# Patient Record
Sex: FEMALE | Race: WHITE | NOT HISPANIC OR LATINO | Employment: UNEMPLOYED | URBAN - METROPOLITAN AREA
[De-identification: names, ages, dates, MRNs, and addresses within clinical notes are randomized per-mention and may not be internally consistent; named-entity substitution may affect disease eponyms.]

---

## 2022-01-22 PROCEDURE — U0004 COV-19 TEST NON-CDC HGH THRU: HCPCS | Performed by: PHYSICIAN ASSISTANT

## 2022-01-22 PROCEDURE — U0005 INFEC AGEN DETEC AMPLI PROBE: HCPCS | Performed by: PHYSICIAN ASSISTANT

## 2022-01-24 ENCOUNTER — TELEPHONE (OUTPATIENT)
Dept: URGENT CARE | Facility: CLINIC | Age: 67
End: 2022-01-24

## 2022-01-24 DIAGNOSIS — B37.9 YEAST INFECTION: Primary | ICD-10-CM

## 2022-01-24 RX ORDER — FLUCONAZOLE 150 MG/1
150 TABLET ORAL ONCE
Qty: 1 TABLET | Refills: 0 | Status: SHIPPED | OUTPATIENT
Start: 2022-01-24 | End: 2022-01-24

## 2022-01-25 NOTE — TELEPHONE ENCOUNTER
Patient is contacted with her positive Covid result.  She is concerned she may develop a yeast infection from the antibiotic being used to treat her sinus infection.  A dose of Diflucan 150 mg one-time dose is ordered at her pharmacy.  She is to see her doctor in 5 to 7 days to follow-up.  Overall she states she is feeling better.  Continue to quarantine

## 2022-01-27 ENCOUNTER — TELEPHONE (OUTPATIENT)
Dept: URGENT CARE | Facility: CLINIC | Age: 67
End: 2022-01-27

## 2022-01-27 PROCEDURE — 87086 URINE CULTURE/COLONY COUNT: CPT | Performed by: PHYSICIAN ASSISTANT

## 2022-01-27 PROCEDURE — 87186 SC STD MICRODIL/AGAR DIL: CPT | Performed by: PHYSICIAN ASSISTANT

## 2022-01-27 PROCEDURE — 87077 CULTURE AEROBIC IDENTIFY: CPT | Performed by: PHYSICIAN ASSISTANT

## 2022-01-27 NOTE — TELEPHONE ENCOUNTER
Patient called in stating that she is having burning while going to the restroom and need another rx for the fluconazole. I did inform the patient she needed to come in to be reevaluated. She stated understand...   
Patient baseline mental status

## 2023-04-24 ENCOUNTER — OFFICE VISIT (OUTPATIENT)
Dept: PODIATRY | Facility: CLINIC | Age: 68
End: 2023-04-24
Payer: MEDICARE

## 2023-04-24 VITALS
DIASTOLIC BLOOD PRESSURE: 89 MMHG | BODY MASS INDEX: 28.25 KG/M2 | OXYGEN SATURATION: 96 % | TEMPERATURE: 98.4 F | HEART RATE: 65 BPM | WEIGHT: 180 LBS | SYSTOLIC BLOOD PRESSURE: 139 MMHG | HEIGHT: 67 IN

## 2023-04-24 DIAGNOSIS — B35.1 ONYCHOMYCOSIS: ICD-10-CM

## 2023-04-24 DIAGNOSIS — M54.41 CHRONIC RIGHT-SIDED LOW BACK PAIN WITH RIGHT-SIDED SCIATICA: Primary | ICD-10-CM

## 2023-04-24 DIAGNOSIS — G89.29 CHRONIC RIGHT-SIDED LOW BACK PAIN WITH RIGHT-SIDED SCIATICA: Primary | ICD-10-CM

## 2023-04-24 DIAGNOSIS — L60.0 INGROWN TOENAIL: ICD-10-CM

## 2023-04-24 RX ORDER — MULTIPLE VITAMINS W/ MINERALS TAB 9MG-400MCG
1 TAB ORAL DAILY
COMMUNITY

## 2023-04-24 RX ORDER — LISINOPRIL 40 MG/1
1 TABLET ORAL DAILY
COMMUNITY
Start: 2023-01-25

## 2023-04-24 NOTE — PROGRESS NOTES
[facility] - PODIATRY    Today's Date: 04/24/23    Patient Name: Kathe Ayon  MRN: [id number]  CSN: [id number]  PCP: Provider, No Known,  Referring Provider: No ref. provider found    SUBJECTIVE     Chief Complaint   Patient presents with   • Right Foot - Establish Care, Nail Problem     Great toenail had trauma 2.5 years ago and came off, nail will grow back then fall off   Itching      • Left Foot - Establish Care, Nail Problem     Great toeanail came off and is slowing growing back      HPI: Kathe Ayon, a 68 y.o.female, presents to clinic.    Patient is a 68-year-old female presenting with right itching to her foot.  Patient states she is unable to satisfy the itch.  Patient says that she thinks she has a pinched nerve in her back from a fall 2 years ago.  Patient also says that her right great toenail falls off and grows back constantly.  She would like to know if there is anything we can do for this.    Past Medical History:   Diagnosis Date   • Hyperlipidemia    • Hypertension      Past Surgical History:   Procedure Laterality Date   • NO PAST SURGERIES       Family History   Problem Relation Age of Onset   • Heart disease Father    • Diabetes Father    • Stroke Neg Hx    • Cancer Neg Hx      Social History     Socioeconomic History   • Marital status:    Tobacco Use   • Smoking status: Never   • Smokeless tobacco: Never   Vaping Use   • Vaping Use: Never used   Substance and Sexual Activity   • Alcohol use: Never   • Drug use: Never   • Sexual activity: Defer     No Known Allergies  Current Outpatient Medications   Medication Sig Dispense Refill   • fluticasone (FLONASE) 50 MCG/ACT nasal spray      • lisinopril (PRINIVIL,ZESTRIL) 40 MG tablet Take 1 tablet by mouth Daily.     • multivitamin with minerals (MULTIVITAMIN ADULTS PO) Take 1 tablet by mouth Daily.     • Omega-3 Fatty Acids (fish oil) 1000 MG capsule capsule Take  by mouth Daily With Breakfast.     • pantoprazole  (PROTONIX) 40 MG EC tablet Take 1 tablet by mouth Daily.     • simvastatin (ZOCOR) 20 MG tablet Take 1 tablet by mouth Daily.     • promethazine-dextromethorphan (PROMETHAZINE-DM) 6.25-15 MG/5ML syrup Take 5 mL by mouth 4 (Four) Times a Day As Needed for Cough. (Patient not taking: Reported on 4/24/2023) 180 mL 0     No current facility-administered medications for this visit.     Review of Systems   Constitutional: Negative.    HENT: Negative.    Eyes: Negative.    Respiratory: Negative.    Cardiovascular: Negative.    Gastrointestinal: Negative.    Endocrine: Negative.    Genitourinary: Negative.    Musculoskeletal: Negative.    Skin: Negative.    Allergic/Immunologic: Negative.    Neurological: Negative.         Itching to foot   Hematological: Negative.    Psychiatric/Behavioral: Negative.    All other systems reviewed and are negative.      OBJECTIVE     Vitals:    04/24/23 0932   BP: 139/89   Pulse: 65   Temp: 98.4 °F (36.9 °C)   SpO2: 96%       No results found for: WBC, RBC, HGB, HCT, MCV, MCH, MCHC, RDW, RDWSD, MPV, PLT, NEUTRORELPCT, LYMPHORELPCT, MONORELPCT, EOSRELPCT, BASORELPCT, AUTOIGPER, NEUTROABS, LYMPHSABS, MONOSABS, EOSABS, BASOSABS, AUTOIGNUM, NRBC      No results found for: GLUCOSE, BUN, CREATININE, EGFRIFNONA, EGFRIFAFRI, BCR, K, CO2, CALCIUM, PROTENTOTREF, ALBUMIN, LABIL2, BILIRUBIN, AST, ALT    Patient seen in no apparent distress.      PHYSICAL EXAM:     Foot/Ankle Exam    GENERAL  Appearance:  appears stated age  Orientation:  AAOx3  Affect:  appropriate  Gait:  unimpaired  Assistance:  independent  Right shoe gear: casual shoe  Left shoe gear: casual shoe    VASCULAR     Right Foot Vascularity   Normal vascular exam    Dorsalis pedis:  2+  Posterior tibial:  2+  Skin temperature:  warm  Edema grading:  None  CFT:  < 3 seconds  Pedal hair growth:  Present  Varicosities:  none     Left Foot Vascularity   Normal vascular exam    Dorsalis pedis:  2+  Posterior tibial:  2+  Skin temperature:   warm  Edema grading:  None  CFT:  < 3 seconds  Pedal hair growth:  Present  Varicosities:  none     NEUROLOGIC     Right Foot Neurologic   Normal sensation    Light touch sensation: normal  Vibratory sensation: normal  Hot/Cold sensation: normal     Left Foot Neurologic   Normal sensation    Light touch sensation: normal  Vibratory sensation: normal  Hot/Cold sensation:  normal    MUSCLE STRENGTH     Right Foot Muscle Strength   Foot dorsiflexion:  4  Foot plantar flexion:  4  Foot inversion:  4  Foot eversion:  4     Left Foot Muscle Strength   Foot dorsiflexion:  4  Foot plantar flexion:  4  Foot inversion:  4  Foot eversion:  4    RANGE OF MOTION     Right Foot Range of Motion   Foot and ankle ROM within normal limits       Left Foot Range of Motion   Foot and ankle ROM within normal limits      DERMATOLOGIC      Right Foot Dermatologic   Skin  Right foot skin is intact.      Left Foot Dermatologic   Skin  Left foot skin is intact.       RADIOLOGY:      No results found.    ASSESSMENT/PLAN     Diagnoses and all orders for this visit:    1. Chronic right-sided low back pain with right-sided sciatica (Primary)  -     Ambulatory Referral to Physical Therapy Evaluate and treat    2. Onychomycosis    3. Ingrown toenail    Patient would like to try physical therapy to see if it helps with her nerve discomfort in her foot.  Discussed permanent removal of the right great toenail.  Patient would like to hold off on removal at this time.  We will have her return to clinic in 6 to 8 weeks or as needed.    Comprehensive lower extremity examination and evaluation was performed.    Discussed findings and treatment plan including risks, benefits, and treatment options with patient in detail. Patient agreed with treatment plan.    Medications and allergies reviewed.  Reviewed available lab values along with other pertinent labs.  These were discussed with the patient.    An After Visit Summary was printed and given to the  patient at discharge, including (if requested) any available informative/educational handouts regarding diagnosis, treatment, or medications. All questions were answered to patient/family satisfaction. Should symptoms fail to improve or worsen they agree to call or return to clinic or to go to the Emergency Department. Discussed the importance of following up with any needed screening tests/labs/specialist appointments and any requested follow-up recommended by me today. Importance of maintaining follow-up discussed and patient accepts that missed appointments can delay diagnosis and potentially lead to worsening of conditions.    No follow-ups on file., or sooner if acute issues arise.    This document has been electronically signed by Jose Gerber DPM on April 24, 2023 11:39 EDT

## 2023-05-18 RX ORDER — METHYLPREDNISOLONE 4 MG/1
TABLET ORAL
Qty: 21 TABLET | Refills: 0 | Status: SHIPPED | OUTPATIENT
Start: 2023-05-18

## 2023-06-05 ENCOUNTER — OFFICE VISIT (OUTPATIENT)
Dept: PODIATRY | Facility: CLINIC | Age: 68
End: 2023-06-05
Payer: MEDICARE

## 2023-06-05 VITALS
DIASTOLIC BLOOD PRESSURE: 74 MMHG | SYSTOLIC BLOOD PRESSURE: 130 MMHG | HEIGHT: 67 IN | BODY MASS INDEX: 28.25 KG/M2 | WEIGHT: 180 LBS | HEART RATE: 65 BPM | OXYGEN SATURATION: 95 % | TEMPERATURE: 98.4 F

## 2023-06-05 DIAGNOSIS — L60.0 INGROWN TOENAIL: ICD-10-CM

## 2023-06-05 DIAGNOSIS — M25.551 PAIN OF RIGHT HIP: Primary | ICD-10-CM

## 2023-06-05 DIAGNOSIS — L03.031 CELLULITIS OF TOE OF RIGHT FOOT: ICD-10-CM

## 2023-06-05 NOTE — PROGRESS NOTES
[facility] - PODIATRY    Today's Date: 06/05/23    Patient Name: Kathe Ayon  MRN: [id number]  CSN: [id number]  PCP: Juma Toscano MD,  Referring Provider: No ref. provider found    SUBJECTIVE     Chief Complaint   Patient presents with    Right Foot - Follow-up     RX follow up / medrol dose pack was sent in on 5/18/23  Feeling better now   Stopped PT due to making pain worse      HPI: Kathe Ayon, a 68 y.o.female, presents to clinic.    Patient is a 68-year-old female presenting with right itching to her foot.  Patient states she is unable to satisfy the itch.  Patient says that she thinks she has a pinched nerve in her back from a fall 2 years ago.  Patient also says that her right great toenail falls off and grows back constantly.  She would like to know if there is anything we can do for this.    6/5/2023-states her right great toe is ingrown.  She still having right hip pain.    Past Medical History:   Diagnosis Date    Hyperlipidemia     Hypertension      Past Surgical History:   Procedure Laterality Date    NO PAST SURGERIES       Family History   Problem Relation Age of Onset    Heart disease Father     Diabetes Father     Stroke Neg Hx     Cancer Neg Hx      Social History     Socioeconomic History    Marital status:    Tobacco Use    Smoking status: Never    Smokeless tobacco: Never   Vaping Use    Vaping Use: Never used   Substance and Sexual Activity    Alcohol use: Never    Drug use: Never    Sexual activity: Defer     No Known Allergies  Current Outpatient Medications   Medication Sig Dispense Refill    fluticasone (FLONASE) 50 MCG/ACT nasal spray       lisinopril (PRINIVIL,ZESTRIL) 40 MG tablet Take 1 tablet by mouth Daily.      multivitamin with minerals tablet tablet Take 1 tablet by mouth Daily.      Omega-3 Fatty Acids (fish oil) 1000 MG capsule capsule Take  by mouth Daily With Breakfast.      pantoprazole (PROTONIX) 40 MG EC tablet Take 1 tablet by mouth  Daily.      simvastatin (ZOCOR) 20 MG tablet Take 1 tablet by mouth Daily.      methylPREDNISolone (MEDROL) 4 MG dose pack Take as directed (Patient not taking: Reported on 6/5/2023) 21 tablet 0    promethazine-dextromethorphan (PROMETHAZINE-DM) 6.25-15 MG/5ML syrup Take 5 mL by mouth 4 (Four) Times a Day As Needed for Cough. (Patient not taking: Reported on 4/24/2023) 180 mL 0     No current facility-administered medications for this visit.     Review of Systems   Constitutional: Negative.    HENT: Negative.     Eyes: Negative.    Respiratory: Negative.     Cardiovascular: Negative.    Gastrointestinal: Negative.    Endocrine: Negative.    Genitourinary: Negative.    Musculoskeletal: Negative.    Skin: Negative.    Allergic/Immunologic: Negative.    Neurological: Negative.         Itching to foot   Hematological: Negative.    Psychiatric/Behavioral: Negative.     All other systems reviewed and are negative.    OBJECTIVE     Vitals:    06/05/23 1249   BP: 130/74   Pulse: 65   Temp: 98.4 °F (36.9 °C)   SpO2: 95%       No results found for: WBC, RBC, HGB, HCT, MCV, MCH, MCHC, RDW, RDWSD, MPV, PLT, NEUTRORELPCT, LYMPHORELPCT, MONORELPCT, EOSRELPCT, BASORELPCT, AUTOIGPER, NEUTROABS, LYMPHSABS, MONOSABS, EOSABS, BASOSABS, AUTOIGNUM, NRBC      No results found for: GLUCOSE, BUN, CREATININE, EGFRIFNONA, EGFRIFAFRI, BCR, K, CO2, CALCIUM, PROTENTOTREF, ALBUMIN, LABIL2, BILIRUBIN, AST, ALT    Patient seen in no apparent distress.      PHYSICAL EXAM:     Foot/Ankle Exam    GENERAL  Appearance:  appears stated age  Orientation:  AAOx3  Affect:  appropriate  Gait:  unimpaired  Assistance:  independent  Right shoe gear: casual shoe  Left shoe gear: casual shoe    VASCULAR     Right Foot Vascularity   Normal vascular exam    Dorsalis pedis:  2+  Posterior tibial:  2+  Skin temperature:  warm  Edema grading:  None  CFT:  < 3 seconds  Pedal hair growth:  Present  Varicosities:  none     Left Foot Vascularity   Normal vascular exam     Dorsalis pedis:  2+  Posterior tibial:  2+  Skin temperature:  warm  Edema grading:  None  CFT:  < 3 seconds  Pedal hair growth:  Present  Varicosities:  none     NEUROLOGIC     Right Foot Neurologic   Normal sensation    Light touch sensation: normal  Vibratory sensation: normal  Hot/Cold sensation: normal     Left Foot Neurologic   Normal sensation    Light touch sensation: normal  Vibratory sensation: normal  Hot/Cold sensation:  normal    MUSCLE STRENGTH     Right Foot Muscle Strength   Foot dorsiflexion:  4  Foot plantar flexion:  4  Foot inversion:  4  Foot eversion:  4     Left Foot Muscle Strength   Foot dorsiflexion:  4  Foot plantar flexion:  4  Foot inversion:  4  Foot eversion:  4    RANGE OF MOTION     Right Foot Range of Motion   Foot and ankle ROM within normal limits       Left Foot Range of Motion   Foot and ankle ROM within normal limits      DERMATOLOGIC      Right Foot Dermatologic   Skin  Right foot skin is intact.      Left Foot Dermatologic   Skin  Left foot skin is intact.      Right foot additional comments: Pain to right great toenail medial border    RADIOLOGY:      No results found.    ASSESSMENT/PLAN     Diagnoses and all orders for this visit:    1. Pain of right hip (Primary)  -     Ambulatory Referral to Orthopedic Surgery    2. Ingrown toenail    3. Cellulitis of toe of right foot      Phenol and Alcohol Chemical Matrixectomy Procedure - This procedure is indicated for onychocryptosis of the right medial nail border(s). Indications, risks and benefits and alternative treatments have been discussed with this patient who has agreed to this procedure. The area was sterilely prepped with a povidone-iodine solution. The affected area was locally anesthetized with 3 ml, of 0.5% Marcaine plain. The offending nail plate was completely excised.  Next 3 applications of 89% phenol were applied to the matrix area x 30 seconds followed by irrigation with copious amounts of isopropyl alcohol.  A  sterile dressing was applied. The patient tolerated the procedure well.     Will get her a referral to orthopedics for continued hip pain following a fall last year.     Comprehensive lower extremity examination and evaluation was performed.    Discussed findings and treatment plan including risks, benefits, and treatment options with patient in detail. Patient agreed with treatment plan.    Medications and allergies reviewed.  Reviewed available lab values along with other pertinent labs.  These were discussed with the patient.    An After Visit Summary was printed and given to the patient at discharge, including (if requested) any available informative/educational handouts regarding diagnosis, treatment, or medications. All questions were answered to patient/family satisfaction. Should symptoms fail to improve or worsen they agree to call or return to clinic or to go to the Emergency Department. Discussed the importance of following up with any needed screening tests/labs/specialist appointments and any requested follow-up recommended by me today. Importance of maintaining follow-up discussed and patient accepts that missed appointments can delay diagnosis and potentially lead to worsening of conditions.    Return in about 2 weeks (around 6/19/2023)., or sooner if acute issues arise.    This document has been electronically signed by Jose Gerber DPM on June 5, 2023 14:19 EDT

## 2023-06-19 ENCOUNTER — OFFICE VISIT (OUTPATIENT)
Dept: PODIATRY | Facility: CLINIC | Age: 68
End: 2023-06-19
Payer: MEDICARE

## 2023-06-19 VITALS
HEIGHT: 67 IN | HEART RATE: 63 BPM | TEMPERATURE: 98 F | DIASTOLIC BLOOD PRESSURE: 84 MMHG | BODY MASS INDEX: 27.62 KG/M2 | OXYGEN SATURATION: 95 % | SYSTOLIC BLOOD PRESSURE: 157 MMHG | WEIGHT: 176 LBS

## 2023-06-19 DIAGNOSIS — B35.1 ONYCHOMYCOSIS: ICD-10-CM

## 2023-06-19 DIAGNOSIS — L60.0 INGROWN TOENAIL: Primary | ICD-10-CM

## 2023-06-19 DIAGNOSIS — L03.031 CELLULITIS OF TOE OF RIGHT FOOT: ICD-10-CM

## 2023-06-19 NOTE — PROGRESS NOTES
[facility] - PODIATRY    Today's Date: 06/19/23    Patient Name: Kathe Ayon  MRN: [id number]  CSN: [id number]  PCP: Juma Toscano MD,  Referring Provider: No ref. provider found    SUBJECTIVE     Chief Complaint   Patient presents with    Right Foot - Follow-up, Ingrown Toenail     Post P & A     HPI: Kathe Ayon, a 68 y.o.female, presents to clinic.    Patient is a 68-year-old female presenting with right itching to her foot.  Patient states she is unable to satisfy the itch.  Patient says that she thinks she has a pinched nerve in her back from a fall 2 years ago.  Patient also says that her right great toenail falls off and grows back constantly.  She would like to know if there is anything we can do for this.    6/5/2023-states her right great toe is ingrown.  She still having right hip pain.    6/19/2023-patient here for follow-up of right great toe pain.  Overall doing better.    Past Medical History:   Diagnosis Date    Hyperlipidemia     Hypertension     Ingrown toenail      Past Surgical History:   Procedure Laterality Date    NO PAST SURGERIES       Family History   Problem Relation Age of Onset    Heart disease Father     Diabetes Father     Stroke Neg Hx     Cancer Neg Hx      Social History     Socioeconomic History    Marital status:    Tobacco Use    Smoking status: Never    Smokeless tobacco: Never   Vaping Use    Vaping Use: Never used   Substance and Sexual Activity    Alcohol use: Never    Drug use: Never    Sexual activity: Defer     No Known Allergies  Current Outpatient Medications   Medication Sig Dispense Refill    fluticasone (FLONASE) 50 MCG/ACT nasal spray       lisinopril (PRINIVIL,ZESTRIL) 40 MG tablet Take 1 tablet by mouth Daily.      multivitamin with minerals tablet tablet Take 1 tablet by mouth Daily.      Omega-3 Fatty Acids (fish oil) 1000 MG capsule capsule Take  by mouth Daily With Breakfast.      pantoprazole (PROTONIX) 40 MG EC tablet Take  1 tablet by mouth Daily.      simvastatin (ZOCOR) 20 MG tablet Take 1 tablet by mouth Daily.      methylPREDNISolone (MEDROL) 4 MG dose pack Take as directed (Patient not taking: Reported on 6/5/2023) 21 tablet 0    promethazine-dextromethorphan (PROMETHAZINE-DM) 6.25-15 MG/5ML syrup Take 5 mL by mouth 4 (Four) Times a Day As Needed for Cough. (Patient not taking: Reported on 4/24/2023) 180 mL 0     No current facility-administered medications for this visit.     Review of Systems   Constitutional: Negative.    HENT: Negative.     Eyes: Negative.    Respiratory: Negative.     Cardiovascular: Negative.    Gastrointestinal: Negative.    Endocrine: Negative.    Genitourinary: Negative.    Musculoskeletal: Negative.    Skin: Negative.    Allergic/Immunologic: Negative.    Neurological: Negative.         Itching to foot   Hematological: Negative.    Psychiatric/Behavioral: Negative.     All other systems reviewed and are negative.    OBJECTIVE     Vitals:    06/19/23 1247   BP: 157/84   Pulse: 63   Temp: 98 °F (36.7 °C)   SpO2: 95%       No results found for: WBC, RBC, HGB, HCT, MCV, MCH, MCHC, RDW, RDWSD, MPV, PLT, NEUTRORELPCT, LYMPHORELPCT, MONORELPCT, EOSRELPCT, BASORELPCT, AUTOIGPER, NEUTROABS, LYMPHSABS, MONOSABS, EOSABS, BASOSABS, AUTOIGNUM, NRBC      No results found for: GLUCOSE, BUN, CREATININE, EGFRIFNONA, EGFRIFAFRI, BCR, K, CO2, CALCIUM, PROTENTOTREF, ALBUMIN, LABIL2, BILIRUBIN, AST, ALT    Patient seen in no apparent distress.      PHYSICAL EXAM:     Foot/Ankle Exam    GENERAL  Appearance:  appears stated age  Orientation:  AAOx3  Affect:  appropriate  Gait:  unimpaired  Assistance:  independent  Right shoe gear: casual shoe  Left shoe gear: casual shoe    VASCULAR     Right Foot Vascularity   Normal vascular exam    Dorsalis pedis:  2+  Posterior tibial:  2+  Skin temperature:  warm  Edema grading:  None  CFT:  < 3 seconds  Pedal hair growth:  Present  Varicosities:  none     Left Foot Vascularity   Normal  vascular exam    Dorsalis pedis:  2+  Posterior tibial:  2+  Skin temperature:  warm  Edema grading:  None  CFT:  < 3 seconds  Pedal hair growth:  Present  Varicosities:  none     NEUROLOGIC     Right Foot Neurologic   Normal sensation    Light touch sensation: normal  Vibratory sensation: normal  Hot/Cold sensation: normal     Left Foot Neurologic   Normal sensation    Light touch sensation: normal  Vibratory sensation: normal  Hot/Cold sensation:  normal    MUSCLE STRENGTH     Right Foot Muscle Strength   Foot dorsiflexion:  4  Foot plantar flexion:  4  Foot inversion:  4  Foot eversion:  4     Left Foot Muscle Strength   Foot dorsiflexion:  4  Foot plantar flexion:  4  Foot inversion:  4  Foot eversion:  4    RANGE OF MOTION     Right Foot Range of Motion   Foot and ankle ROM within normal limits       Left Foot Range of Motion   Foot and ankle ROM within normal limits      DERMATOLOGIC      Right Foot Dermatologic   Skin  Right foot skin is intact.      Left Foot Dermatologic   Skin  Left foot skin is intact.     RADIOLOGY:      No results found.    ASSESSMENT/PLAN     Diagnoses and all orders for this visit:    1. Ingrown toenail (Primary)    2. Cellulitis of toe of right foot    3. Onychomycosis          Comprehensive lower extremity examination and evaluation was performed.    Discussed findings and treatment plan including risks, benefits, and treatment options with patient in detail. Patient agreed with treatment plan.    Medications and allergies reviewed.  Reviewed available lab values along with other pertinent labs.  These were discussed with the patient.    An After Visit Summary was printed and given to the patient at discharge, including (if requested) any available informative/educational handouts regarding diagnosis, treatment, or medications. All questions were answered to patient/family satisfaction. Should symptoms fail to improve or worsen they agree to call or return to clinic or to go to the  Emergency Department. Discussed the importance of following up with any needed screening tests/labs/specialist appointments and any requested follow-up recommended by me today. Importance of maintaining follow-up discussed and patient accepts that missed appointments can delay diagnosis and potentially lead to worsening of conditions.    Return if symptoms worsen or fail to improve., or sooner if acute issues arise.    This document has been electronically signed by Jose Gerber DPM on June 19, 2023 13:29 EDT

## 2023-08-07 ENCOUNTER — OFFICE VISIT (OUTPATIENT)
Dept: ORTHOPEDIC SURGERY | Facility: CLINIC | Age: 68
End: 2023-08-07
Payer: MEDICARE

## 2023-08-07 VITALS
SYSTOLIC BLOOD PRESSURE: 135 MMHG | HEIGHT: 67 IN | OXYGEN SATURATION: 98 % | WEIGHT: 175 LBS | BODY MASS INDEX: 27.47 KG/M2 | DIASTOLIC BLOOD PRESSURE: 83 MMHG | HEART RATE: 67 BPM

## 2023-08-07 DIAGNOSIS — M51.16 LUMBAR DISC DISEASE WITH RADICULOPATHY: ICD-10-CM

## 2023-08-07 DIAGNOSIS — M76.891 HIP TENDONITIS, RIGHT: Primary | ICD-10-CM

## 2023-08-07 NOTE — PROGRESS NOTES
"Chief Complaint  Pain and Follow-up of the Right Hip    Subjective          Kathe Ayon presents to [facility] for   History of Present Illness    The patient presents here today for follow up evaluation of the right hip. The patient has been treating her right hip tendonitis conservatively. She reports relief with the previous injection. She has been doing home exercises.     No Known Allergies     Social History     Socioeconomic History    Marital status:    Tobacco Use    Smoking status: Never    Smokeless tobacco: Never   Vaping Use    Vaping Use: Never used   Substance and Sexual Activity    Alcohol use: Never    Drug use: Never    Sexual activity: Defer        I reviewed the patient's chief complaint, history of present illness, review of systems, past medical history, surgical history, family history, social history, medications, and allergy list.     REVIEW OF SYSTEMS    Constitutional: Denies fevers, chills, weight loss  Cardiovascular: Denies chest pain, shortness of breath  Skin: Denies rashes, acute skin changes  Neurologic: Denies headache, loss of consciousness  MSK: Right hip pain      Objective   Vital Signs:   /83   Pulse 67   Ht 170.2 cm (67\")   Wt 79.4 kg (175 lb)   SpO2 98%   BMI 27.41 kg/mý     Body mass index is 27.41 kg/mý.    Physical Exam    General: Alert. No acute distress.   Right hip- Positive EHL, FHL, GS and TA. Sensation intact to all 5 nerves of the foot. Positive pulses. Non-tender today. No pain with hip motion today. Flexion 90. External Rotation 45. Internal rotation 20. 5/5 hip flexion and Abduction. Full knee ROM.      Procedures    Imaging Results (Most Recent)       None                     Assessment and Plan        No results found.     Diagnoses and all orders for this visit:    1. Hip tendonitis, right (Primary)    2. Lumbar disc disease with radiculopathy        Discussed the treatment plan with the patient.  Plan for " conservative treatment at this time. Plan to continue home exercises at this time. She can resume activity as tolerated.         Call or return if worsening symptoms.    Scribed for Curtis Jewell MD by Leydi Chen  08/07/2023   09:07 EDT         Follow Up       PRN    Patient was given instructions and counseling regarding her condition or for health maintenance advice. Please see specific information pulled into the AVS if appropriate.       I have personally performed the services described in this document as scribed by the above individual and it is both accurate and complete.     Curtis Jewell MD  08/07/23  09:09 EDT

## 2023-10-02 ENCOUNTER — OFFICE VISIT (OUTPATIENT)
Dept: ORTHOPEDIC SURGERY | Facility: CLINIC | Age: 68
End: 2023-10-02
Payer: MEDICARE

## 2023-10-02 VITALS — BODY MASS INDEX: 28.72 KG/M2 | OXYGEN SATURATION: 98 % | HEIGHT: 67 IN | WEIGHT: 183 LBS | HEART RATE: 62 BPM

## 2023-10-02 DIAGNOSIS — M76.891 HIP TENDONITIS, RIGHT: Primary | ICD-10-CM

## 2023-10-02 RX ORDER — LIDOCAINE HYDROCHLORIDE 10 MG/ML
5 INJECTION, SOLUTION INFILTRATION; PERINEURAL
Status: COMPLETED | OUTPATIENT
Start: 2023-10-02 | End: 2023-10-02

## 2023-10-02 RX ORDER — TRIAMCINOLONE ACETONIDE 40 MG/ML
40 INJECTION, SUSPENSION INTRA-ARTICULAR; INTRAMUSCULAR
Status: COMPLETED | OUTPATIENT
Start: 2023-10-02 | End: 2023-10-02

## 2023-10-02 RX ADMIN — LIDOCAINE HYDROCHLORIDE 5 ML: 10 INJECTION, SOLUTION INFILTRATION; PERINEURAL at 09:29

## 2023-10-02 RX ADMIN — TRIAMCINOLONE ACETONIDE 40 MG: 40 INJECTION, SUSPENSION INTRA-ARTICULAR; INTRAMUSCULAR at 09:29

## 2023-10-02 NOTE — PROGRESS NOTES
"Chief Complaint  Pain and Follow-up of the Right Hip    Subjective          Kathe Ayon presents to [facility] for   History of Present Illness    The patient presents here today for evaluation of the right hip. The patient has been treating her right hip tendonitis conservatively. She reports relief with the previous injection. She has no new complaints.     No Known Allergies     Social History     Socioeconomic History    Marital status:    Tobacco Use    Smoking status: Never    Smokeless tobacco: Never   Vaping Use    Vaping Use: Never used   Substance and Sexual Activity    Alcohol use: Never    Drug use: Never    Sexual activity: Defer        I reviewed the patient's chief complaint, history of present illness, review of systems, past medical history, surgical history, family history, social history, medications, and allergy list.     REVIEW OF SYSTEMS    Constitutional: Denies fevers, chills, weight loss  Cardiovascular: Denies chest pain, shortness of breath  Skin: Denies rashes, acute skin changes  Neurologic: Denies headache, loss of consciousness  MSK: right hip pain      Objective   Vital Signs:   Pulse 62   Ht 170.2 cm (67\")   Wt 83 kg (183 lb)   SpO2 98%   BMI 28.66 kg/m²     Body mass index is 28.66 kg/m².    Physical Exam    General: Alert. No acute distress.   Right hip- Positive EHL, FHL, GS and TA. Sensation intact to all 5 nerves of the foot. Positive pulses. Non-tender today. No pain with hip motion today. Flexion 90. External Rotation 45. Internal rotation 20. 5/5 hip flexion and Abduction. Full knee ROM.       Large Joint Arthrocentesis: R greater trochanteric bursa  Date/Time: 10/2/2023 9:29 AM  Consent given by: patient  Site marked: site marked  Timeout: Immediately prior to procedure a time out was called to verify the correct patient, procedure, equipment, support staff and site/side marked as required   Supporting Documentation  Indications: " pain   Procedure Details  Location: hip - R greater trochanteric bursa  Preparation: 21 G.  Medications administered: 5 mL lidocaine 1 %; 40 mg triamcinolone acetonide 40 MG/ML  Patient tolerance: patient tolerated the procedure well with no immediate complications      Imaging Results (Most Recent)       None                     Assessment and Plan        No results found.     Diagnoses and all orders for this visit:    1. Hip tendonitis, right (Primary)        Discussed the treatment plan with the patient.  Discussed the risks and benefits of a right hip bursa injection. The patient expressed understanding and wished to proceed. She tolerated the injection well.       Call or return if worsening symptoms.    Scribed for Curtis Jewell MD by Leydi Chen  10/02/2023   09:08 EDT         Follow Up       PRN    Patient was given instructions and counseling regarding her condition or for health maintenance advice. Please see specific information pulled into the AVS if appropriate.       I have personally performed the services described in this document as scribed by the above individual and it is both accurate and complete.     Curtis Jewell MD  10/02/23  09:25 EDT

## 2024-02-12 ENCOUNTER — OFFICE VISIT (OUTPATIENT)
Dept: ORTHOPEDIC SURGERY | Facility: CLINIC | Age: 69
End: 2024-02-12
Payer: MEDICARE

## 2024-02-12 VITALS
HEIGHT: 67 IN | HEART RATE: 62 BPM | SYSTOLIC BLOOD PRESSURE: 158 MMHG | WEIGHT: 183 LBS | OXYGEN SATURATION: 97 % | BODY MASS INDEX: 28.72 KG/M2 | DIASTOLIC BLOOD PRESSURE: 92 MMHG

## 2024-02-12 DIAGNOSIS — M76.891 HIP TENDONITIS, RIGHT: Primary | ICD-10-CM

## 2024-02-12 DIAGNOSIS — M53.3 SI (SACROILIAC) PAIN: ICD-10-CM

## 2024-02-12 DIAGNOSIS — M51.16 LUMBAR DISC DISEASE WITH RADICULOPATHY: ICD-10-CM

## 2024-02-12 PROCEDURE — 99213 OFFICE O/P EST LOW 20 MIN: CPT

## 2024-02-21 ENCOUNTER — OFFICE VISIT (OUTPATIENT)
Dept: ORTHOPEDIC SURGERY | Facility: CLINIC | Age: 69
End: 2024-02-21
Payer: MEDICARE

## 2024-02-21 VITALS
OXYGEN SATURATION: 97 % | WEIGHT: 185 LBS | BODY MASS INDEX: 29.03 KG/M2 | HEART RATE: 65 BPM | SYSTOLIC BLOOD PRESSURE: 137 MMHG | HEIGHT: 67 IN | DIASTOLIC BLOOD PRESSURE: 78 MMHG

## 2024-02-21 DIAGNOSIS — M53.3 SI (SACROILIAC) PAIN: ICD-10-CM

## 2024-02-21 DIAGNOSIS — M76.891 HIP TENDONITIS, RIGHT: ICD-10-CM

## 2024-02-21 DIAGNOSIS — M51.16 LUMBAR DISC DISEASE WITH RADICULOPATHY: Primary | ICD-10-CM

## 2024-02-21 RX ORDER — TRIAMCINOLONE ACETONIDE 40 MG/ML
40 INJECTION, SUSPENSION INTRA-ARTICULAR; INTRAMUSCULAR
Status: COMPLETED | OUTPATIENT
Start: 2024-02-21 | End: 2024-02-21

## 2024-02-21 RX ORDER — LIDOCAINE HYDROCHLORIDE 10 MG/ML
5 INJECTION, SOLUTION INFILTRATION; PERINEURAL
Status: COMPLETED | OUTPATIENT
Start: 2024-02-21 | End: 2024-02-21

## 2024-02-21 RX ADMIN — TRIAMCINOLONE ACETONIDE 40 MG: 40 INJECTION, SUSPENSION INTRA-ARTICULAR; INTRAMUSCULAR at 08:32

## 2024-02-21 RX ADMIN — LIDOCAINE HYDROCHLORIDE 5 ML: 10 INJECTION, SOLUTION INFILTRATION; PERINEURAL at 08:32

## 2024-02-21 NOTE — PROGRESS NOTES
"Chief Complaint  Follow-up and Pain of the Right Hip    Subjective          Kathe Ayon presents to [facility] for   History of Present Illness    The patient presents here today for follow up evaluation of the right hip. The patient has been treating her right hip tendonitis, lumbar DJD and SI joint pain. She locates pain to the posterior hip. She has no other complaints. She has pain with prolong standing. She sees Dr. Morales for her back.     No Known Allergies     Social History     Socioeconomic History    Marital status:    Tobacco Use    Smoking status: Never    Smokeless tobacco: Never   Vaping Use    Vaping Use: Never used   Substance and Sexual Activity    Alcohol use: Never    Drug use: Never    Sexual activity: Defer        I reviewed the patient's chief complaint, history of present illness, review of systems, past medical history, surgical history, family history, social history, medications, and allergy list.     REVIEW OF SYSTEMS    Constitutional: Denies fevers, chills, weight loss  Cardiovascular: Denies chest pain, shortness of breath  Skin: Denies rashes, acute skin changes  Neurologic: Denies headache, loss of consciousness  MSK: Right hip pain      Objective   Vital Signs:   /78   Pulse 65   Ht 170.2 cm (67\")   Wt 83.9 kg (185 lb)   SpO2 97%   BMI 28.98 kg/m²     Body mass index is 28.98 kg/m².    Physical Exam    General: Alert. No acute distress.   Right hip-Mild palpable tenderness to SI joint.  No pain with passive hip ROM.  Intact active hip ROM with no pain.  No pain with palpation along trochanteric bursa.  Non tender over the thigh or knee distally.  Demonstrates active knee flexion and extension.  Demonstrates intact active ankle dorsiflexion and plantarflexion.  Sensation intact.  Calf soft, nontender. Neurovascular intact distally. Palpable pedal pulses.      Large Joint Arthrocentesis: R greater trochanteric bursa  Date/Time: " 2/21/2024 8:32 AM  Consent given by: patient  Site marked: site marked  Timeout: Immediately prior to procedure a time out was called to verify the correct patient, procedure, equipment, support staff and site/side marked as required   Supporting Documentation  Indications: pain   Procedure Details  Location: hip - R greater trochanteric bursa  Needle gauge: 21 G.  Medications administered: 5 mL lidocaine 1 %; 40 mg triamcinolone acetonide 40 MG/ML  Patient tolerance: patient tolerated the procedure well with no immediate complications      Imaging Results (Most Recent)       None                     Assessment and Plan        No results found.     Diagnoses and all orders for this visit:    1. Lumbar disc disease with radiculopathy (Primary)    2. SI (sacroiliac) pain    3. Hip tendonitis, right        Discussed the treatment plan with the patient.  I recommended her talking to Dr. Morales about injections for her lumbar spine as this is where most of her pain is located.   Discussed the risks and benefits of a right hip bursa injection. The patient expressed understanding and wished to proceed. She tolerated the injection well.         Call or return if worsening symptoms.    Scribed for Curtis Jewell MD by Leydi Chen  02/21/2024   08:16 EST         Follow Up       3 months    Patient was given instructions and counseling regarding her condition or for health maintenance advice. Please see specific information pulled into the AVS if appropriate.       I have personally performed the services described in this document as scribed by the above individual and it is both accurate and complete.     Curtis Jewell MD  02/21/24  08:28 EST